# Patient Record
(demographics unavailable — no encounter records)

---

## 2024-11-18 NOTE — HISTORY OF PRESENT ILLNESS
[N] : Patient does not use contraception [Y] : Patient is sexually active [Monogamous (Male Partner)] : is monogamous with a male partner [Normal Amount/Duration] :  normal amount and duration [Regular Cycle Intervals] : periods have been regular [No] : Patient does not have concerns regarding sex [Currently Active] : currently active [LMPDate] : 11/05/24 [MensesLength] : 5 [MensesFreq] : 28 [MensesAmount] : moderate [FreeTextEntry1] : 11/05/24

## 2024-11-18 NOTE — PHYSICAL EXAM
[Chaperone Present] : A chaperone was present in the examining room during all aspects of the physical examination [18742] : A chaperone was present during the pelvic exam. [Appropriately responsive] : appropriately responsive [Alert] : alert [No Acute Distress] : no acute distress [Soft] : soft [Non-tender] : non-tender [Non-distended] : non-distended [Oriented x3] : oriented x3 [Examination Of The Breasts] : a normal appearance [No Discharge] : no discharge [No Masses] : no breast masses were palpable [Labia Majora] : normal [Labia Minora] : normal [Normal] : normal [Uterine Adnexae] : normal [FreeTextEntry2] : JACEK Cornejo

## 2024-11-18 NOTE — PLAN
[FreeTextEntry1] : The patient's history and presentation were reviewed and discussed with Dr. Chavarria. An assessment was conducted in collaboration with Dr. Chavarria, and the plan of care was formulated and discussed accordingly.  Pt seen and examined with NP, Mabel Broussard. The analysis and plan were agreed upon and implemented.